# Patient Record
Sex: MALE
[De-identification: names, ages, dates, MRNs, and addresses within clinical notes are randomized per-mention and may not be internally consistent; named-entity substitution may affect disease eponyms.]

---

## 2017-12-08 ENCOUNTER — HOSPITAL ENCOUNTER (EMERGENCY)
Dept: HOSPITAL 31 - C.ER | Age: 57
Discharge: HOME | End: 2017-12-08
Payer: MEDICARE

## 2017-12-08 VITALS
OXYGEN SATURATION: 94 % | HEART RATE: 85 BPM | SYSTOLIC BLOOD PRESSURE: 164 MMHG | TEMPERATURE: 97.4 F | DIASTOLIC BLOOD PRESSURE: 82 MMHG | RESPIRATION RATE: 20 BRPM

## 2017-12-08 DIAGNOSIS — K43.9: Primary | ICD-10-CM

## 2017-12-08 NOTE — C.PDOC
Time Seen by Provider: 12/08/17 09:21


Chief Complaint (Nursing): Medical Clearance





Past Medical History


Reviewed: Historical Data, Nursing Documentation, Vital Signs


Vital Signs: 


 Last Vital Signs











Temp  97.4 F L  12/08/17 08:52


 


Pulse  85   12/08/17 08:52


 


Resp  20   12/08/17 08:52


 


BP  164/82 H  12/08/17 08:52


 


Pulse Ox  94 L  12/08/17 09:28














- Medical History


PMH: Hypercholesterolemia


Family History: States: Unknown Family Hx





- Social History


Hx Alcohol Use: Yes


Hx Substance Use: No





- Immunization History


Hx Tetanus Toxoid Vaccination: No


Hx Influenza Vaccination: No


Hx Pneumococcal Vaccination: No





Review Of Systems


Constitutional: Negative for: Fever, Chills


Cardiovascular: Negative for: Chest Pain


Respiratory: Negative for: Cough, Shortness of Breath


Gastrointestinal: Positive for: Other (abdominal protrusion).  Negative for: 

Nausea, Vomiting, Abdominal Pain, Diarrhea


Skin: Negative for: Rash





Physical Exam





- Physical Exam


Appears: Non-toxic, No Acute Distress


Skin: Normal Color, Warm, Dry


Head: Atraumatic, Normacephalic, Other (parotid enlargement bilaterally)


Oral Mucosa: Moist


Chest: Symmetrical


Cardiovascular: Rhythm Regular, No Murmur


Respiratory: Normal Breath Sounds, No Rales, No Rhonchi, No Wheezing


Gastrointestinal/Abdominal: Soft, No Tenderness, No Guarding, No Rebound, Other 

(abdomen 12 cm epigastric abdominal diastasis, w/o herniation )


Back: Normal Inspection


Extremity: Normal ROM, Capillary Refill (< 2 sec. )


Neurological/Psych: Oriented x3, Normal Speech, Normal Cognition





ED Course And Treatment


O2 Sat by Pulse Oximetry: 94 (RA)


Pulse Ox Interpretation: Normal





Medical Decision Making


Medical Decision Making: 





early epigastric abd diathesis without herniation- chronic


old RUQ abd lipoma, benign


no s/s of abd disease





weight loss and referral for surgical consult PRN





Disposition


Doctor Will See Patient In The: Office


Counseled Patient/Family Regarding: Studies Performed, Diagnosis





- Disposition


Referrals: 


Catrachito Armando MD [Staff Provider] - 


Bola Kumari DO [Staff Provider] - 


Disposition: HOME/ ROUTINE


Disposition Time: 09:28


Condition: GOOD


Additional Instructions: 


benign abdominal diathesis/developing ventral hernia


weight loss


Strenthen abdominal muscles


outpatient eval by General Surgery- Dr Rodriguez- as needed. 





Follow-up with Dr. Kumari 


Instructions:  Ventral Hernia (ED)


Forms:  [facility] (English)





- Clinical Impression


Clinical Impression: 


 Abdominal wall hernia








- Scribe Statement


The provider has reviewed the documentation as recorded by the Scribe





SM





All medical record entries made by the Scribe were at my direction and 

personally dictated by me. I have reviewed the chart and agree that the record 

accurately reflects my personal performance of the history, physical exam, 

medical decision making, and the department course for this patient. I have 

also personally directed, reviewed, and agree with the discharge instructions 

and disposition.

## 2018-09-16 ENCOUNTER — HOSPITAL ENCOUNTER (EMERGENCY)
Dept: HOSPITAL 31 - C.ER | Age: 58
Discharge: HOME | End: 2018-09-16
Payer: MEDICARE

## 2018-09-16 VITALS — SYSTOLIC BLOOD PRESSURE: 151 MMHG | DIASTOLIC BLOOD PRESSURE: 87 MMHG | HEART RATE: 74 BPM | RESPIRATION RATE: 18 BRPM

## 2018-09-16 VITALS — TEMPERATURE: 98.3 F

## 2018-09-16 VITALS — OXYGEN SATURATION: 20 %

## 2018-09-16 VITALS — BODY MASS INDEX: 30.8 KG/M2

## 2018-09-16 DIAGNOSIS — K04.7: Primary | ICD-10-CM

## 2018-09-16 NOTE — C.PDOC
History Of Present Illness


57 year old male comes in for evaluation of right upper toothache, associated 

with gum swelling which had developed yesterday. Patient states he woke up 

today with facial swelling. Otherwise, pt denies any trauma, fever, chills, 

headache, dizziness, vertigo, earache, drooling, trismus, dyspnea, SOB, wheezing

, denies any other active complaints.  (Yoli Doyle)


History Per: Patient


History/Exam Limitations: no limitations


Onset/Duration Of Symptoms: Days


Current Symptoms Are (Timing): Still Present


Time Seen by Provider: 09/16/18 08:01


Chief Complaint (Nursing): Dental Pain





Past Medical History


Reviewed: Historical Data, Nursing Documentation, Vital Signs





- Medical History


PMH: Hypercholesterolemia


Family History: States: No Known Family Hx





- Social History


Hx Alcohol Use: Yes


Hx Substance Use: No





- Immunization History


Hx Tetanus Toxoid Vaccination: No


Hx Influenza Vaccination: No


Hx Pneumococcal Vaccination: No


Vital Signs: 


 Last Vital Signs











Temp  98.3 F   09/16/18 08:05


 


Pulse  74   09/16/18 08:38


 


Resp  18   09/16/18 08:38


 


BP  151/87 H  09/16/18 08:38


 


Pulse Ox  20 L  09/16/18 08:55














Review Of Systems


Except As Marked, All Systems Reviewed And Found Negative.


Constitutional: Negative for: Fever, Chills


ENT: Positive for: Other (Toothache)


Respiratory: Negative for: Shortness of Breath


Skin: Positive for: Other (Facial swelling)


Neurological: Negative for: Weakness, Numbness, Other (dyspnea)





Physical Exam





- Physical Exam


Appears: Well, Non-toxic, No Acute Distress


Skin: Normal Color, Warm, Dry, No Rash


Head: Normacephalic


Eye(s): bilateral: PERRL


Ear(s): Bilateral: Normal


Nose: No Flaring, No Discharge


Oral Mucosa: Moist, No Drooling, No Trismus


Tongue: Normal Appearing


Lips: Normal Appearing


Teeth: Caries (#3), Tender To Palpation (#3)


Gingiva: Erythema (diffuse around #3), Swelling, Tender, Abscess (sarah #3)


Throat: No Erythema, No Exudate, No Drooling, Other (uvla midline, no edema.)


Neck: Normal ROM, Trachea Midline, Supple


Cardiovascular: Rhythm Regular


Respiratory: No Decreased Breath Sounds, No Accessory Muscle Use, No Stridor, 

No Wheezing


Extremity: Normal ROM, No Deformity, No Swelling


Neurological/Psych: Oriented x3, Normal Speech





ED Course And Treatment


O2 Sat by Pulse Oximetry: 20 (RA)


Pulse Ox Interpretation: Normal


Progress Note: On re-eval, pt is afebrile, hemodynamicaly stable.  NOn-toxic, 

tolerate PO well in ED.  PulseOx.  ENT: exam c/w #3 early abscess, (-) 

flactulance. Uvula midline, no edema, n drooling or trismus.  Neck: Supple, (-) 

meningeal sign, (-) LDN.  Lungs: CTA B/L, BS equal B/L.  Pt advised on course 

of ds.  ref. to F/u with Dentist in 1-2 dyas for re-eavl.  return to ED if any 

worsening or new changes.





Disposition


Counseled Patient/Family Regarding: Diagnosis, Need For Followup, Rx Given





- Disposition


Disposition Time: 08:31





- Disposition


Referrals: 


[facility] [Provider Group]


[facility] [Provider Group]


Disposition: HOME/ ROUTINE


Condition: STABLE


Additional Instructions: 


Warm salty water tooth bath 2-3 times daily for 5 minutes


Take medication as prescribed


Follow up with Dentist in 2-3 days for re-evaluation.


Return to ED if any worsening or new changes.


Prescriptions: 


Clindamycin [Cleocin] 300 mg PO Q6 #28 cap


Ibuprofen [Motrin Tab] 600 mg PO BID #14 tab


Instructions:  Tooth Abscess (DC)


Forms:  [facility] (English)





- Clinical Impression


Clinical Impression: 


 Dental abscess








- PA / NP / Resident Statement


MD/DO has reviewed & agrees with the documentation as recorded.





- Scribe Statement


The provider has reviewed the documentation as recorded by the Scribe





- Scribe Statement





Paula Stevens





All medical record entries made by the Scribe were at my direction and 

personally dictated by me. I have reviewed the chart and agree that the record 

accurately reflects my personal performance of the history, physical exam, 

medical decision making, and the department course for this patient. I have 

also personally directed, reviewed, and agree with the discharge instructions 

and disposition. (Yoli Doyle)

## 2018-10-29 ENCOUNTER — HOSPITAL ENCOUNTER (EMERGENCY)
Dept: HOSPITAL 31 - C.ER | Age: 58
Discharge: HOME | End: 2018-10-29
Payer: MEDICARE

## 2018-10-29 VITALS
RESPIRATION RATE: 19 BRPM | DIASTOLIC BLOOD PRESSURE: 83 MMHG | SYSTOLIC BLOOD PRESSURE: 143 MMHG | HEART RATE: 71 BPM | TEMPERATURE: 98.2 F

## 2018-10-29 VITALS — OXYGEN SATURATION: 96 %

## 2018-10-29 VITALS — BODY MASS INDEX: 30.8 KG/M2

## 2018-10-29 DIAGNOSIS — R10.32: Primary | ICD-10-CM

## 2018-10-29 LAB
ALBUMIN SERPL-MCNC: 4.4 G/DL (ref 3.5–5)
ALBUMIN/GLOB SERPL: 1.6 {RATIO} (ref 1–2.1)
ALT SERPL-CCNC: 40 U/L (ref 21–72)
APTT BLD: 31 SECONDS (ref 21–34)
AST SERPL-CCNC: 23 U/L (ref 17–59)
BASOPHILS # BLD AUTO: 0.1 K/UL (ref 0–0.2)
BASOPHILS NFR BLD: 0.9 % (ref 0–2)
BILIRUB UR-MCNC: NEGATIVE MG/DL
BUN SERPL-MCNC: 13 MG/DL (ref 9–20)
CALCIUM SERPL-MCNC: 9.1 MG/DL (ref 8.6–10.4)
EOSINOPHIL # BLD AUTO: 0.2 K/UL (ref 0–0.7)
EOSINOPHIL NFR BLD: 2.4 % (ref 0–4)
ERYTHROCYTE [DISTWIDTH] IN BLOOD BY AUTOMATED COUNT: 14.2 % (ref 11.5–14.5)
GFR NON-AFRICAN AMERICAN: > 60
GLUCOSE UR STRIP-MCNC: NORMAL MG/DL
HGB BLD-MCNC: 14.7 G/DL (ref 12–18)
INR PPP: 1.1
LEUKOCYTE ESTERASE UR-ACNC: (no result) LEU/UL
LIPASE: 69 U/L (ref 23–300)
LYMPHOCYTES # BLD AUTO: 2.8 K/UL (ref 1–4.3)
LYMPHOCYTES NFR BLD AUTO: 29.8 % (ref 20–40)
MCH RBC QN AUTO: 30.7 PG (ref 27–31)
MCHC RBC AUTO-ENTMCNC: 34.3 G/DL (ref 33–37)
MCV RBC AUTO: 89.5 FL (ref 80–94)
MONOCYTES # BLD: 0.9 K/UL (ref 0–0.8)
MONOCYTES NFR BLD: 9.7 % (ref 0–10)
NEUTROPHILS # BLD: 5.4 K/UL (ref 1.8–7)
NEUTROPHILS NFR BLD AUTO: 57.2 % (ref 50–75)
NRBC BLD AUTO-RTO: 0 % (ref 0–2)
PH UR STRIP: 5 [PH] (ref 5–8)
PLATELET # BLD: 184 K/UL (ref 130–400)
PMV BLD AUTO: 8 FL (ref 7.2–11.7)
PROT UR STRIP-MCNC: NEGATIVE MG/DL
PROTHROMBIN TIME: 11.7 SECONDS (ref 9.7–12.2)
RBC # BLD AUTO: 4.8 MIL/UL (ref 4.4–5.9)
RBC # UR STRIP: NEGATIVE /UL
SP GR UR STRIP: 1.03 (ref 1–1.03)
SQUAMOUS EPITHIAL: 1 /HPF (ref 0–5)
UROBILINOGEN UR-MCNC: 2 MG/DL (ref 0.2–1)
WBC # BLD AUTO: 9.4 K/UL (ref 4.8–10.8)

## 2018-10-29 PROCEDURE — 85610 PROTHROMBIN TIME: CPT

## 2018-10-29 PROCEDURE — 81001 URINALYSIS AUTO W/SCOPE: CPT

## 2018-10-29 PROCEDURE — 74177 CT ABD & PELVIS W/CONTRAST: CPT

## 2018-10-29 PROCEDURE — 85730 THROMBOPLASTIN TIME PARTIAL: CPT

## 2018-10-29 PROCEDURE — 85025 COMPLETE CBC W/AUTO DIFF WBC: CPT

## 2018-10-29 PROCEDURE — 83690 ASSAY OF LIPASE: CPT

## 2018-10-29 PROCEDURE — 80053 COMPREHEN METABOLIC PANEL: CPT

## 2018-10-29 PROCEDURE — 99285 EMERGENCY DEPT VISIT HI MDM: CPT

## 2018-10-29 NOTE — C.PDOC
History Of Present Illness


58 year old male, whose PMHx includes high cholesterol, presents to the ED for 

evaluation of lower abdominal pain. Patient states he took a Cologuard test, 

which was positive for colon cancer. Patient is scheduled for his first colon

scopy next month. He denies fever, chills, nausea, vomiting, or changes in bowel

habits. 





PMD: Bola Doherty 


Time Seen by Provider: 10/29/18 10:31


Chief Complaint (Nursing): Abdominal Pain


History Per: Patient


History/Exam Limitations: no limitations


Onset/Duration Of Symptoms: Days


Current Symptoms Are (Timing): Still Present


Location Of Pain/Discomfort: LLQ


Radiation Of Pain To:: None


Quality Of Discomfort: "Pain"


Associated Symptoms: denies: Fever, Chills, Nausea, Vomiting, Diarrhea, 

Constipation


Additional History Per: Patient





Past Medical History


Reviewed: Historical Data, Nursing Documentation, Vital Signs


Vital Signs: 





                                Last Vital Signs











Temp  98.4 F   10/29/18 10:20


 


Pulse  95 H  10/29/18 10:20


 


Resp  18   10/29/18 10:20


 


BP  154/92 H  10/29/18 10:20


 


Pulse Ox  96   10/29/18 10:20














- Medical History


PMH: Hypercholesterolemia


Surgical History: No Surg Hx


Family History: States: Unknown Family Hx





- Social History


Hx Alcohol Use: Yes


Hx Substance Use: No





- Immunization History


Hx Tetanus Toxoid Vaccination: No


Hx Influenza Vaccination: No


Hx Pneumococcal Vaccination: No





Review Of Systems


Constitutional: Negative for: Fever, Chills


Gastrointestinal: Positive for: Abdominal Pain (left lower quadrant ).  Negative

for: Nausea, Vomiting, Diarrhea, Constipation





Physical Exam





- Physical Exam


Appears: Non-toxic, No Acute Distress


Skin: Normal Color, Warm, Dry


Head: Atraumatic, Normacephalic


Eye(s): bilateral: Normal Inspection


Oral Mucosa: Moist


Neck: Supple


Chest: Symmetrical, No Deformity, No Tenderness


Cardiovascular: Rhythm Regular, No Murmur


Respiratory: Normal Breath Sounds, No Rales, No Rhonchi, No Wheezing


Gastrointestinal/Abdominal: Soft, Tenderness (left lower quadrant ), Guarding, N

o Rebound


Extremity: Normal ROM, Capillary Refill (less than 2 seconds )


Neurological/Psych: Oriented x3, Normal Speech, Normal Cognition





ED Course And Treatment





- Laboratory Results


Result Diagrams: 


                                 10/29/18 11:49





                                 10/29/18 11:49


O2 Sat by Pulse Oximetry: 96 (on RA)


Pulse Ox Interpretation: Normal





- CT Scan/US


  ** CT A/P


Other Rad Studies (CT/US): Read By Radiologist, Radiology Report Reviewed


CT/US Interpretation: Date of service:  10/29/2018.  PROCEDURE:  CT Abdomen and 

Pelvis with contrast.  HISTORY:  LLQ abd pain, positive Cologuard.  COMPARISON: 

None.  TECHNIQUE:  Contrast dose: 100 mL Visipaque 320.  Radiation dose:  Total 

exam DLP = 1452.07 mGy-cm.  This CT exam was performed using one or more of the 

following dose reduction techniques: Automated exposure control, adjustment of 

the mA and/or kV according to patient size, and/or use of iterative 

reconstruction technique.  FINDINGS:  LOWER THORAX:  Unremarkable.  LIVER:  

Diffuse hepatic steatosis.  No gross lesion or ductal dilatation.  GALLBLADDER 

AND BILE DUCTS:  Unremarkable.  PANCREAS:  Unremarkable. No gross lesion or 

ductal dilatation.  SPLEEN:  Unremarkable.  ADRENALS:  Unremarkable. No mass.  

KIDNEYS AND URETERS:  Unremarkable. No hydronephrosis. No solid mass.  VASCULA

TURE:  Unremarkable. No aortic aneurysm. No aortic atherosclerotic calcification

or mural plaque present.  BOWEL:  Unremarkable. No obstruction. No gross mural 

thickening.  APPENDIX:  Normal appendix.  PERITONEUM:  Anterior abdominal wall 

3.8 x 2.0 cm fluid collection.  Small fat containing left inguinal hernia.  No 

free fluid. No free air.  LYMPH NODES:  Unremarkable. No enlarged lymph nodes.  

BLADDER:  Unremarkable.  REPRODUCTIVE:  Unremarkable.  BONES:  No acute 

fracture.  OTHER FINDINGS:  None.  IMPRESSION:  No acute abdominal pelvic 

pathology.  No obvious area of colonic wall thickening.  Redemonstration of 

anterior abdominal wall subcutaneous fluid collection measuring 3.8 x 2.0 cm.  D

ifficult to determine whether this has increased in size as this was only 

partially imaged on previous CT lung screening examination performed on 

11/02/2017.


Progress Note: Bloodwork, urinalysis, CT A/P ordered and reviewed.





Disposition





- Disposition


Disposition: HOME/ ROUTINE


Disposition Time: 14:58


Condition: STABLE


Additional Instructions: 


follow up with your PMD and Gastroenterologist within 1-2 days. Return to ED if 

feel worse.


Instructions:  Acute Abdomen (Belly Pain), Adult (DC)


Forms:  [facility] (English)





- Clinical Impression


Clinical Impression: 


 Abdominal pain








- PA / NP / Resident Statement


MD/DO has reviewed & agrees with the documentation as recorded.





- Scribe Statement


The provider has reviewed the documentation as recorded by the Scribe (Argelia Webb)








All medical record entries made by the Scribe were at my direction and 

personally dictated by me. I have reviewed the chart and agree that the record 

accurately reflects my personal performance of the history, physical exam, 

medical decision making, and the department course for this patient. I have also

personally directed, reviewed, and agree with the discharge instructions and 

disposition.

## 2018-10-29 NOTE — CT
Date of service: 



10/29/2018



PROCEDURE:  CT Abdomen and Pelvis with contrast



HISTORY:

LLQ abd pain, positive Cologuard



COMPARISON:

None.



TECHNIQUE:

Contrast dose: 100 mL Visipaque 320



Radiation dose:



Total exam DLP = 1452.07 mGy-cm.



This CT exam was performed using one or more of the following dose 

reduction techniques: Automated exposure control, adjustment of the 

mA and/or kV according to patient size, and/or use of iterative 

reconstruction technique.



FINDINGS:



LOWER THORAX:

Unremarkable. 



LIVER:

Diffuse hepatic steatosis.  No gross lesion or ductal dilatation. 



GALLBLADDER AND BILE DUCTS:

Unremarkable. 



PANCREAS:

Unremarkable. No gross lesion or ductal dilatation.



SPLEEN:

Unremarkable. 



ADRENALS:

Unremarkable. No mass. 



KIDNEYS AND URETERS:

Unremarkable. No hydronephrosis. No solid mass. 



VASCULATURE:

Unremarkable. No aortic aneurysm. No aortic atherosclerotic 

calcification or mural plaque present.



BOWEL:

Unremarkable. No obstruction. No gross mural thickening. 



APPENDIX:

Normal appendix. 



PERITONEUM:

Anterior abdominal wall 3.8 x 2.0 cm fluid collection.  Small fat 

containing left inguinal hernia.  No free fluid. No free air. 



LYMPH NODES:

Unremarkable. No enlarged lymph nodes. 



BLADDER:

Unremarkable. 



REPRODUCTIVE:

Unremarkable. 



BONES:

No acute fracture. 



OTHER FINDINGS:

None.



IMPRESSION:

No acute abdominal pelvic pathology.



No obvious area of colonic wall thickening.



Redemonstration of anterior abdominal wall subcutaneous fluid 

collection measuring 3.8 x 2.0 cm.  Difficult to determine whether 

this has increased in size as this was only partially imaged on 

previous CT lung screening examination performed on 11/02/2017.

## 2019-02-15 ENCOUNTER — HOSPITAL ENCOUNTER (OUTPATIENT)
Dept: HOSPITAL 42 - ENDO | Age: 59
Discharge: HOME | End: 2019-02-15
Payer: MEDICARE

## 2019-02-15 VITALS
RESPIRATION RATE: 19 BRPM | TEMPERATURE: 97.5 F | SYSTOLIC BLOOD PRESSURE: 141 MMHG | DIASTOLIC BLOOD PRESSURE: 82 MMHG | OXYGEN SATURATION: 96 % | HEART RATE: 70 BPM

## 2019-02-15 VITALS — BODY MASS INDEX: 30.8 KG/M2

## 2019-02-15 DIAGNOSIS — K63.5: ICD-10-CM

## 2019-02-15 DIAGNOSIS — K64.1: ICD-10-CM

## 2019-02-15 DIAGNOSIS — D12.2: ICD-10-CM

## 2019-02-15 DIAGNOSIS — K59.00: ICD-10-CM

## 2019-02-15 DIAGNOSIS — Z12.11: Primary | ICD-10-CM

## 2019-02-15 PROCEDURE — 45385 COLONOSCOPY W/LESION REMOVAL: CPT

## 2019-02-15 PROCEDURE — 45381 COLONOSCOPY SUBMUCOUS NJX: CPT

## 2019-02-15 PROCEDURE — 88305 TISSUE EXAM BY PATHOLOGIST: CPT
